# Patient Record
Sex: FEMALE | Race: WHITE | NOT HISPANIC OR LATINO | Employment: OTHER
[De-identification: names, ages, dates, MRNs, and addresses within clinical notes are randomized per-mention and may not be internally consistent; named-entity substitution may affect disease eponyms.]

---

## 2023-11-28 ENCOUNTER — TRANSCRIBE ORDERS (OUTPATIENT)
Dept: OTHER | Age: 68
End: 2023-11-28

## 2023-11-28 DIAGNOSIS — I48.11 LONGSTANDING PERSISTENT ATRIAL FIBRILLATION (H): Primary | ICD-10-CM

## 2023-12-13 ENCOUNTER — OFFICE VISIT (OUTPATIENT)
Dept: CARDIOLOGY | Facility: CLINIC | Age: 68
End: 2023-12-13
Payer: COMMERCIAL

## 2023-12-13 VITALS
OXYGEN SATURATION: 93 % | BODY MASS INDEX: 40.15 KG/M2 | WEIGHT: 241 LBS | HEIGHT: 65 IN | HEART RATE: 85 BPM | SYSTOLIC BLOOD PRESSURE: 131 MMHG | DIASTOLIC BLOOD PRESSURE: 68 MMHG

## 2023-12-13 DIAGNOSIS — E11.9 TYPE 2 DIABETES, HBA1C GOAL < 7% (H): ICD-10-CM

## 2023-12-13 DIAGNOSIS — E66.01 MORBID OBESITY (H): ICD-10-CM

## 2023-12-13 DIAGNOSIS — I48.11 LONGSTANDING PERSISTENT ATRIAL FIBRILLATION (H): ICD-10-CM

## 2023-12-13 DIAGNOSIS — Z86.79 S/P ABLATION OF ATRIAL FIBRILLATION: Primary | ICD-10-CM

## 2023-12-13 DIAGNOSIS — I10 PRIMARY HYPERTENSION: ICD-10-CM

## 2023-12-13 DIAGNOSIS — Z98.890 S/P ABLATION OF ATRIAL FIBRILLATION: Primary | ICD-10-CM

## 2023-12-13 PROCEDURE — 93000 ELECTROCARDIOGRAM COMPLETE: CPT | Performed by: INTERNAL MEDICINE

## 2023-12-13 PROCEDURE — 99205 OFFICE O/P NEW HI 60 MIN: CPT | Performed by: INTERNAL MEDICINE

## 2023-12-13 RX ORDER — METOPROLOL TARTRATE 100 MG
1 TABLET ORAL 2 TIMES DAILY
COMMUNITY
Start: 2023-08-31

## 2023-12-13 RX ORDER — GABAPENTIN 100 MG/1
100 CAPSULE ORAL
COMMUNITY
Start: 2023-07-03

## 2023-12-13 RX ORDER — SEMAGLUTIDE 2.68 MG/ML
2 INJECTION, SOLUTION SUBCUTANEOUS
COMMUNITY
Start: 2023-03-20

## 2023-12-13 RX ORDER — AMLODIPINE BESYLATE 5 MG/1
1 TABLET ORAL DAILY
COMMUNITY
Start: 2023-03-03

## 2023-12-13 RX ORDER — ESCITALOPRAM OXALATE 5 MG/1
5 TABLET ORAL DAILY
COMMUNITY

## 2023-12-13 RX ORDER — METFORMIN HCL 500 MG
500 TABLET, EXTENDED RELEASE 24 HR ORAL
COMMUNITY
Start: 2023-03-03

## 2023-12-13 RX ORDER — ATORVASTATIN CALCIUM 40 MG/1
1 TABLET, FILM COATED ORAL DAILY
COMMUNITY
Start: 2023-03-03

## 2023-12-13 RX ORDER — NICOTINE POLACRILEX 4 MG/1
20 GUM, CHEWING ORAL
COMMUNITY
Start: 2023-08-31

## 2023-12-13 RX ORDER — LISINOPRIL 20 MG/1
20 TABLET ORAL DAILY
COMMUNITY
Start: 2023-07-03

## 2023-12-13 NOTE — LETTER
12/13/2023      RE: Karolina Mckeon  [address] [address]       Dear Colleague,    Thank you for the opportunity to participate in the care of your patient, Karolina Mckeon, at the [facility]. Please see a copy of my visit note below.          [facility] Cardiology [facility]      Referring provider: Self-referred    HPI: Ms. Karolina Mckeon is a 68 year old  female with PMH significant for    -Hypertension  -Diabetes type 2  -Obesity  -Obstructive sleep apnea on CPAP  -Atrial fibrillation status post PVI/CTI 8/31/2023  -Multiple cardioversions for A-fib    Patient tells me that she has atrial fibrillation since 2018.  She was on rate control through her primary care physician for 5 years or so then the patient did some research on her own and found out ablation option.  Subsequently saw EP at [facility] in [place] and had cardioversion once which did not work.  Subsequently underwent A-fib ablation on 8/31.  She was feeling well being in sinus rhythm for 2 weeks or so, then unfortunately she had COVID infection in September and flipped back into A-fib.  She had cardioversion twice but did not maintain normal rhythm (ERAF).  She was recently seen in EP clinic in [place] and she was recommended to continue rate control.  Amiodarone was stopped and she was recommended to stay on metoprolol and Xarelto.  She is being seen today for second opinion.    She reports nonexertional shoulder pain but denies palpitations, dizziness, syncope, chest pain.  She feels short of breath with stairs and longer walks.  She tells me that she was feeling way more energetic during those 2 weeks after ablation.    Echocardiogram 6/2022 shows normal LVEF.    Since she started Ozempic she has lost weight.  Diabetes is well-controlled.  Compliant with blood pressure medications.    She does not smoke.  No alcohol  "abuse.    Medications, personal, family, and social history reviewed with patient and revised.    PAST MEDICAL HISTORY:  No past medical history on file.    CURRENT MEDICATIONS:  No current outpatient medications on file.       PAST SURGICAL HISTORY:  No past surgical history on file.    ALLERGIES:   Not on File    FAMILY HISTORY:  No family history on file.      SOCIAL HISTORY:       ROS:   Constitutional: No fever, chills, or sweats. Weight stable.   Cardiovascular: As per HPI.       Exam:  BP (!) 144/77 (BP Location: Right arm, Patient Position: Chair, Cuff Size: Adult Large)   Pulse 85   Ht 1.651 m (5' 5\")   Wt 109.3 kg (241 lb)   SpO2 93%   BMI 40.10 kg/m    GENERAL APPEARANCE: alert and no distress  HEENT: no icterus, no central cyanosis  LYMPH/NECK: no adenopathy, no asymmetry, JVP not elevated, no carotid bruits.  RESPIRATORY: lungs clear to auscultation - no rales, rhonchi or wheezes, no use of accessory muscles, no retractions, respirations are unlabored, normal respiratory rate  CARDIOVASCULAR: regular rhythm, normal S1, S2, no S3 or S4 and no murmur, click or rub, precordium quiet with normal PMI.  GI: soft, non tender  EXTREMITIES: no edema  NEURO: alert, normal speech,and affect  SKIN: no ecchymoses, no rashes     I have reviewed the labs and personally reviewed the imaging below and made my comment in the assessment and plan.    Labs:  CBC RESULTS:   No results found for: \"WBC\", \"RBC\", \"HGB\", \"HCT\", \"MCV\", \"MCH\", \"MCHC\", \"RDW\", \"PLT\"    BMP RESULTS:  No results found for: \"NA\", \"POTASSIUM\", \"CHLORIDE\", \"CO2\", \"ANIONGAP\", \"GLC\", \"BUN\", \"CR\", \"GFRESTIMATED\", \"GFRESTBLACK\", \"ARA\"         Echocardiogram  No results found for this or any previous visit (from the past 8760 hour(s)).      PVI [facility] 8/31/2023  Successful pulmonary vein isolation with RF ablation   Successful posterior wall isolation   Successful Cavotricuspid isthmus ablation with evidence for   bidirectional block.   Normal AV " node conduction   Normal HV interval.       DC cardioversion 11/10/2023: Successful DC cardioversion but with early recurrence of atrial fibrillation    [facility] 3/7/2023:  Myocardial perfusion imaging is normal.   No evidence of myocardial infarction or inducible myocardial ischemia.   Overall left ventricular systolic function was normal without regional wall motion abnormalities. EF 70%.   Resting EKG shows atrial fibrillation with non-specific T wave abnormalities. Non-diagnostic ECG stress due to resting abnormalities.   No prior study for comparison.     Transthoracic echocardiogram [facility] 6/2022:  Technically difficult echocardiogram   The left ventricular systolic function is hyperdynamic.   There is mild concentric hypertrophy.   Left atrium is moderately enlarged by volume.   Diastolic function could not be accurately assessed due to atrial fibrillation.   No significant valvular stenosis or insufficiency seen.   TR signal inadequate to allow accurate estimate RV systolic pressure.   There is no pericardial effusion.   Compared to echocardiogram 6/26/18, findings are similar.     Assessment and Plan:   Karolina Mckeon is a 68 year old female with a past medical history of HTN, DMII, obesity, WILLI on CPAP and longstanding persistent atrial fibrillation s/p PVI  8/31/2023.  Patient maintained sinus rhythm for only 2 weeks after ablation.  She contracted COVID infection which flipped her back to A-fib.  Unfortunately she had 2 cardioversions post A-fib ablation.  Last cardioversion on 11/10/2023 converted her to sinus rhythm but she had early recurrence of A-fib.  She is here for second opinion for A-fib management.  She tells me that she felt way more energetic when she was in sinus rhythm then in A-fib.  I think she might benefit from second procedure (the patient was told about a possible convergent procedure and therefore referred to [facility] versus [facility]) but I will refer  her to EP to discuss this.  In the meantime I will have her wear a Zio patch for 2 weeks to determine current burden.  Will continue rate control and oral anticoagulation in the time being.    # Longstanding persistent atrial fibrillation  -She has known A-fib since 2018.  She was on rate control for several years.  -She converted to sinus rhythm for few weeks after ablation in August of this year but she is back in A-fib now.  I reviewed EKG in clinic which shows atrial fibrillation heart rate 79 bpm.  Rate well-controlled.  On oral anticoagulation.  She reports she was more energetic when she was in normal rhythm though it was a very short time.   -Will refer to EP to discuss further management.  -Zio patch for 2 weeks  -Transthoracic echocardiogram.    # HTN  -Well-controlled.    # WILLI  -compliant with CPAP    # Morbid obesity  # Diabetes type 2  -Discussed importance of weight loss.  She tells me that she has started losing weight since being on Ozempic.    Follow up  with me after EP visits are completed.    Total time spent today for this visit is 70 minutes including precharting, reviewing outside hospital medical records, face-to-face clinic visit, review of labs/imaging and medical documentation.    Andrew BARAJAS MD  [facility] Division of Cardiology  Pager [phone]       Please do not hesitate to contact me if you have any questions/concerns.     Sincerely,     Andrew Barajas MD

## 2023-12-13 NOTE — PROGRESS NOTES
[facility]      Referring provider: Self-referred    HPI: Ms. Karolina Mckeon is a 68 year old  female with PMH significant for    -Hypertension  -Diabetes type 2  -Obesity  -Obstructive sleep apnea on CPAP  -Atrial fibrillation status post PVI/CTI 8/31/2023  -Multiple cardioversions for A-fib    Patient tells me that she has atrial fibrillation since 2018.  She was on rate control through her primary care physician for 5 years or so then the patient did some research on her own and found out ablation option.  Subsequently saw EP at [facility] in [place] and had cardioversion once which did not work.  Subsequently underwent A-fib ablation on 8/31.  She was feeling well being in sinus rhythm for 2 weeks or so, then unfortunately she had COVID infection in September and flipped back into A-fib.  She had cardioversion twice but did not maintain normal rhythm (ERAF).  She was recently seen in EP clinic in [place] and she was recommended to continue rate control.  Amiodarone was stopped and she was recommended to stay on metoprolol and Xarelto.  She is being seen today for second opinion.    She reports nonexertional shoulder pain but denies palpitations, dizziness, syncope, chest pain.  She feels short of breath with stairs and longer walks.  She tells me that she was feeling way more energetic during those 2 weeks after ablation.    Echocardiogram 6/2022 shows normal LVEF.    Since she started Ozempic she has lost weight.  Diabetes is well-controlled.  Compliant with blood pressure medications.    She does not smoke.  No alcohol abuse.    Medications, personal, family, and social history reviewed with patient and revised.    PAST MEDICAL HISTORY:  No past medical history on file.    CURRENT MEDICATIONS:  No current outpatient medications on file.       PAST SURGICAL HISTORY:  No past surgical history on file.    ALLERGIES:   Not on File    FAMILY HISTORY:  No family history on  "file.      SOCIAL HISTORY:       ROS:   Constitutional: No fever, chills, or sweats. Weight stable.   Cardiovascular: As per HPI.       Exam:  BP (!) 144/77 (BP Location: Right arm, Patient Position: Chair, Cuff Size: Adult Large)   Pulse 85   Ht 1.651 m (5' 5\")   Wt 109.3 kg (241 lb)   SpO2 93%   BMI 40.10 kg/m    GENERAL APPEARANCE: alert and no distress  HEENT: no icterus, no central cyanosis  LYMPH/NECK: no adenopathy, no asymmetry, JVP not elevated, no carotid bruits.  RESPIRATORY: lungs clear to auscultation - no rales, rhonchi or wheezes, no use of accessory muscles, no retractions, respirations are unlabored, normal respiratory rate  CARDIOVASCULAR: regular rhythm, normal S1, S2, no S3 or S4 and no murmur, click or rub, precordium quiet with normal PMI.  GI: soft, non tender  EXTREMITIES: no edema  NEURO: alert, normal speech,and affect  SKIN: no ecchymoses, no rashes     I have reviewed the labs and personally reviewed the imaging below and made my comment in the assessment and plan.    Labs:  CBC RESULTS:   No results found for: \"WBC\", \"RBC\", \"HGB\", \"HCT\", \"MCV\", \"MCH\", \"MCHC\", \"RDW\", \"PLT\"    BMP RESULTS:  No results found for: \"NA\", \"POTASSIUM\", \"CHLORIDE\", \"CO2\", \"ANIONGAP\", \"GLC\", \"BUN\", \"CR\", \"GFRESTIMATED\", \"GFRESTBLACK\", \"ARA\"         Echocardiogram  No results found for this or any previous visit (from the past 8760 hour(s)).      [facility] 8/31/2023  Successful pulmonary vein isolation with RF ablation   Successful posterior wall isolation   Successful Cavotricuspid isthmus ablation with evidence for   bidirectional block.   Normal AV node conduction   Normal HV interval.       DC cardioversion 11/10/2023: Successful DC cardioversion but with early recurrence of atrial fibrillation    [facility] 3/7/2023:  Myocardial perfusion imaging is normal.   No evidence of myocardial infarction or inducible myocardial ischemia.   Overall left ventricular systolic function was " normal without regional wall motion abnormalities. EF 70%.   Resting EKG shows atrial fibrillation with non-specific T wave abnormalities. Non-diagnostic ECG stress due to resting abnormalities.   No prior study for comparison.     Transthoracic echocardiogram [facility] 6/2022:  Technically difficult echocardiogram   The left ventricular systolic function is hyperdynamic.   There is mild concentric hypertrophy.   Left atrium is moderately enlarged by volume.   Diastolic function could not be accurately assessed due to atrial fibrillation.   No significant valvular stenosis or insufficiency seen.   TR signal inadequate to allow accurate estimate RV systolic pressure.   There is no pericardial effusion.   Compared to echocardiogram 6/26/18, findings are similar.     Assessment and Plan:   Karolina Mckeon is a 68 year old female with a past medical history of HTN, DMII, obesity, WILLI on CPAP and longstanding persistent atrial fibrillation s/p PVI  8/31/2023.  Patient maintained sinus rhythm for only 2 weeks after ablation.  She contracted COVID infection which flipped her back to A-fib.  Unfortunately she had 2 cardioversions post A-fib ablation.  Last cardioversion on 11/10/2023 converted her to sinus rhythm but she had early recurrence of A-fib.  She is here for second opinion for A-fib management.  She tells me that she felt way more energetic when she was in sinus rhythm then in A-fib.  I think she might benefit from second procedure (the patient was told about a possible convergent procedure and therefore referred to [facility] versus [facility]) but I will refer her to EP to discuss this.  In the meantime I will have her wear a Zio patch for 2 weeks to determine current burden.  Will continue rate control and oral anticoagulation in the time being.    # Longstanding persistent atrial fibrillation  -She has known A-fib since 2018.  She was on rate control for several years.  -She converted to sinus rhythm for  few weeks after ablation in August of this year but she is back in A-fib now.  I reviewed EKG in clinic which shows atrial fibrillation heart rate 79 bpm.  Rate well-controlled.  On oral anticoagulation.  She reports she was more energetic when she was in normal rhythm though it was a very short time.   -Will refer to EP to discuss further management.  -Zio patch for 2 weeks  -Transthoracic echocardiogram.    # HTN  -Well-controlled.    # WILLI  -compliant with CPAP    # Morbid obesity  # Diabetes type 2  -Discussed importance of weight loss.  She tells me that she has started losing weight since being on Ozempic.    Follow up  with me after EP visits are completed.    Total time spent today for this visit is 70 minutes including precharting, reviewing outside hospital medical records, face-to-face clinic visit, review of labs/imaging and medical documentation.    Andrew BARAJAS MD  [facility] Division of Cardiology  Pager [phone]     Addendum note 2/29/2024:  Zio patch showed 100% A-fib.  I have suggested starting amiodarone and cardioversion before EP appointment to discuss catheter ablation.  The patient declined reporting that this did not work in the past.  No further change in the plan with regards to A-fib until she sees EP which I think she needs repeat ablation.  DR BARAJAS   22-Sep-2022 20:00

## 2023-12-13 NOTE — PATIENT INSTRUCTIONS
Thank you for coming to the [facility]; please note the following instructions:    1.  We have applied a holter (heart) monitor for you to wear for 2 weeks.  You may remove the heart monitor on 12/27/23 at 3:30pm.  Please see the enclosed instructions for further information, as well as instructions for removal of the heart monitor and return mailing directions.  If you should have questions regarding your monitor, please call [employer] at [phone].  The Cardiology Nurse will contact you with your results (please see result notification details at bottom of summary).    *PLEASE DO NOT SHOWER OR INDUCE EXCESSIVE SWEATING IN THE FIRST 24 HOURS OF WEARING*    2. Dr. Frederick appointment- April 10, 2024 7:30 am Video Visit to discuss ablation     3.  Can follow up as needed        If you have any questions regarding your visit please contact your care team:     Cardiology  Telephone Number   Elda BAILEY., RN  Julieta BRUNO, RN  Kayleigh MARTINEZ, RN  Shell LANDIS, RMOMID CAIN, RUDY DUMONT, Visit Facilitator  Leonila GIPSON [facility] [phone] (option 1)   For scheduling appts:     [phone] (select option 1)       For the Device Clinic (Pacemakers and ICD's)  RN's :  Katy Chapman   During business hours: [phone]    *After business hours:  [phone] (select option 4)      Normal test result notifications will be released via [email or web address] or mailed within 7 business days.  All other test results, will be communicated via telephone once reviewed by your cardiologist.    If you need a medication refill please contact your pharmacy.  Please allow 3 business days for your refill to be completed.    As always, thank you for trusting us with your health care needs!

## 2023-12-14 LAB
ATRIAL RATE - MUSE: 78 BPM
DIASTOLIC BLOOD PRESSURE - MUSE: NORMAL MMHG
INTERPRETATION ECG - MUSE: NORMAL
P AXIS - MUSE: NORMAL DEGREES
PR INTERVAL - MUSE: NORMAL MS
QRS DURATION - MUSE: 90 MS
QT - MUSE: 418 MS
QTC - MUSE: 479 MS
R AXIS - MUSE: 40 DEGREES
SYSTOLIC BLOOD PRESSURE - MUSE: NORMAL MMHG
T AXIS - MUSE: -72 DEGREES
VENTRICULAR RATE- MUSE: 79 BPM

## 2023-12-28 ENCOUNTER — ANCILLARY PROCEDURE (OUTPATIENT)
Dept: CARDIOLOGY | Facility: CLINIC | Age: 68
End: 2023-12-28
Attending: INTERNAL MEDICINE
Payer: COMMERCIAL

## 2023-12-28 DIAGNOSIS — I48.11 LONGSTANDING PERSISTENT ATRIAL FIBRILLATION (H): ICD-10-CM

## 2023-12-28 LAB — LVEF ECHO: NORMAL

## 2023-12-28 PROCEDURE — 93306 TTE W/DOPPLER COMPLETE: CPT | Performed by: INTERNAL MEDICINE

## 2024-02-29 ENCOUNTER — TELEPHONE (OUTPATIENT)
Dept: CARDIOLOGY | Facility: CLINIC | Age: 69
End: 2024-02-29
Payer: COMMERCIAL

## 2024-02-29 NOTE — TELEPHONE ENCOUNTER
Andrew Watkins MD  You1 hour ago (4:18 PM)     IC  Thank you.  No nothing else.     You  Andrew Watkins MD1 hour ago (4:11 PM)     CK  Discussed amiodarone and cardioversion. Patient did not feel comfortable with starting amiodarone and cardioversion. She says she had both in the past and did not find it helpful so she did not wish to pursue it. Let me know if you advise anything else     Provider updated. Patient aware of appointment in April. No changes at this time.    Julieta Vazquez, RN, BSN  Cardiology RN Care Coordinator   [place]   Phone: [phone]  Fax: [phone] ([place]) [phone] ([place])

## 2024-02-29 NOTE — TELEPHONE ENCOUNTER
Andrew Barajas MD Krist, Chana, LATRICIA  Please call the patient and let me know if she is interested in amiodarone follow-up with cardioversion before she sees EP.    DR KARL Barajas MD  2/29/2024  1:54 PM CST Back to [facility],     Your heart monitor shows that you have been in atrial fibrillation throughout the whole time.  The options are I think we can put you on a medication called amiodarone (it has some significant side effects including thyroid, skin, liver which occur in the long-term rarely in the short-term) and schedule you for cardioversion before you see EP.  Patients who take this medication prior to cardioversion are more likely to sustain this rhythm.  And you can discuss second ablation with your electrophysiologist.     Let me know what you think.     DR BARAJAS     Called and spoke with patient. Relayed information to patient. Patient states that she has been on amiodarone in the past and has had multiple cardioversions.  Patient does not want to pursue amiodarone and cardioversion at this time. They wished to discuss ablation with Dr. Frederick as scheduled on 4/10/24. Informed patient to update if she changes her mind. Informed patient provider would be updated.    Julieta Vazquez, RN, BSN  Cardiology RN Care Coordinator   [place]   Phone: [phone]  Fax: [phone] ([place]) [phone] ([place])

## 2024-03-10 ENCOUNTER — HEALTH MAINTENANCE LETTER (OUTPATIENT)
Age: 69
End: 2024-03-10

## 2024-05-19 ENCOUNTER — HEALTH MAINTENANCE LETTER (OUTPATIENT)
Age: 69
End: 2024-05-19

## 2024-07-28 ENCOUNTER — HEALTH MAINTENANCE LETTER (OUTPATIENT)
Age: 69
End: 2024-07-28

## 2024-12-15 ENCOUNTER — HEALTH MAINTENANCE LETTER (OUTPATIENT)
Age: 69
End: 2024-12-15

## 2025-03-16 ENCOUNTER — HEALTH MAINTENANCE LETTER (OUTPATIENT)
Age: 70
End: 2025-03-16

## 2025-06-08 ENCOUNTER — HEALTH MAINTENANCE LETTER (OUTPATIENT)
Age: 70
End: 2025-06-08

## 2025-06-29 ENCOUNTER — HEALTH MAINTENANCE LETTER (OUTPATIENT)
Age: 70
End: 2025-06-29